# Patient Record
Sex: FEMALE | Race: WHITE | NOT HISPANIC OR LATINO | Employment: UNEMPLOYED | URBAN - METROPOLITAN AREA
[De-identification: names, ages, dates, MRNs, and addresses within clinical notes are randomized per-mention and may not be internally consistent; named-entity substitution may affect disease eponyms.]

---

## 2017-02-23 ENCOUNTER — TRANSCRIBE ORDERS (OUTPATIENT)
Dept: ADMINISTRATIVE | Facility: HOSPITAL | Age: 47
End: 2017-02-23

## 2017-02-23 DIAGNOSIS — N92.0 EXCESSIVE OR FREQUENT MENSTRUATION: Primary | ICD-10-CM

## 2017-02-27 ENCOUNTER — HOSPITAL ENCOUNTER (OUTPATIENT)
Dept: RADIOLOGY | Facility: HOSPITAL | Age: 47
Discharge: HOME | End: 2017-02-27
Attending: OBSTETRICS & GYNECOLOGY
Payer: COMMERCIAL

## 2017-02-27 DIAGNOSIS — N92.0 EXCESSIVE OR FREQUENT MENSTRUATION: ICD-10-CM

## 2017-02-27 PROCEDURE — 76830 TRANSVAGINAL US NON-OB: CPT

## 2017-02-27 PROCEDURE — 76856 US EXAM PELVIC COMPLETE: CPT

## 2017-06-21 ENCOUNTER — TRANSCRIBE ORDERS (OUTPATIENT)
Dept: ADMINISTRATIVE | Facility: HOSPITAL | Age: 47
End: 2017-06-21

## 2017-06-21 DIAGNOSIS — Z12.31 VISIT FOR SCREENING MAMMOGRAM: Primary | ICD-10-CM

## 2017-07-24 ENCOUNTER — HOSPITAL ENCOUNTER (OUTPATIENT)
Dept: RADIOLOGY | Facility: HOSPITAL | Age: 47
Discharge: HOME | End: 2017-07-24
Attending: OBSTETRICS & GYNECOLOGY
Payer: COMMERCIAL

## 2017-07-24 DIAGNOSIS — Z12.31 VISIT FOR SCREENING MAMMOGRAM: ICD-10-CM

## 2017-07-24 PROCEDURE — G0202 SCR MAMMO BI INCL CAD: HCPCS

## 2017-08-17 ENCOUNTER — ALLSCRIPTS OFFICE VISIT (OUTPATIENT)
Dept: OTHER | Facility: OTHER | Age: 47
End: 2017-08-17

## 2017-08-30 ENCOUNTER — ALLSCRIPTS OFFICE VISIT (OUTPATIENT)
Dept: OTHER | Facility: OTHER | Age: 47
End: 2017-08-30

## 2018-01-12 VITALS
SYSTOLIC BLOOD PRESSURE: 112 MMHG | HEART RATE: 76 BPM | HEIGHT: 61 IN | RESPIRATION RATE: 16 BRPM | DIASTOLIC BLOOD PRESSURE: 64 MMHG | OXYGEN SATURATION: 97 % | BODY MASS INDEX: 23.41 KG/M2 | WEIGHT: 124 LBS

## 2018-01-12 NOTE — PROGRESS NOTES
Chief Complaint  Patient her for nurse visit fir PPD placement as per job requirement  PPD placed to [facility] as per protocol and patient has appointment to have site read on Friday  Active Problems    1  Depression screen (V79 0) (Z13 89)   2  PPD screening test (V74 1) (Z11 1)    Current Meds   1  Daily Multiple Vitamins Oral Tablet; Therapy: (Recorded:[id number]) to Recorded    Allergies    1  Codeine Derivatives    2  Apples   3  FRUIT   4  No Known Latex Allergies    Assessment    1  PPD screening test (V74 1) (Z11 1)    Plan  PPD screening test    · Tubersol 5 UNIT/0 1ML Intradermal Solution    Future Appointments    Date/Time Provider Specialty Site   09/01/2017 01:30 PM Ayanna Medina, Nurse Schedule  [facility]     Signatures   Electronically signed by :  JEROD Smith ; Aug 30 2017 12:29PM EST                       (Acknowledgement)

## 2018-01-17 NOTE — PROGRESS NOTES
Assessment    1  Depression screen (V79 0) (Z13 89)   2  Encounter for preventive health examination (V70 0) (Z00 00)    Plan  Depression screen    · *VB-Depression Screening; Status:Resulted - Requires Verification,Retrospective  Authorization;   Done: [id number] 10:43AM    Discussion/Summary  health maintenance visit Currently, she eats a healthy diet  the risks and benefits of cervical cancer screening were discussed cervical cancer screening is current Testing was done today for declines testing  Breast cancer screening: the risks and benefits of breast cancer screening were discussed and mammogram is current  Colorectal cancer screening: colorectal cancer screening is not indicated  Osteoporosis screening: the risks and benefits of osteoporosis screening were discussed  The risks and benefits of immunizations were discussed  She was advised to be evaluated by a dentist  Advice and education were given regarding nutrition, aerobic exercise, contraception, cardiovascular risk reduction, alcohol use, sunscreen use, self skin examination and seat belt use  42-year-old female here for first physical exam in our office - she's starting a new job  - no concerns today  - return as needed or annually for physical exam  - advised to follow up with dentist annually  - gyn/pelvic/breast exam deferred as per patient request since patient states that she has a gyn appointment next week with Dr Addison    d/w Dr Geeta Parada  The patient was counseled regarding instructions for management, risk factor reductions, prognosis, patient and family education, impressions, risks and benefits of treatment options, importance of compliance with treatment  The treatment plan was reviewed with the patient/guardian   The patient/guardian understands and agrees with the treatment plan      Chief Complaint  physical for work      History of Present Illness  HPI: 42-year-old female here for first annual physical to our office  - states that she needs a physical for - states that 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  - today she has no complaints  - states that her last physical was more than 5 years ago   currently- not on any meds    pshx- breast augmentation surgery- had mammo recently - gets follow up with her breast surgeon  allergies- fruits- apples and peaches- can't eat the raw fruits  fhx- mother has DM , mother side has DM  social- lives at home 3 kids, doesn't smoke or illict drugs, drinks wine occasional   diet- states she eats healthy varied diet consisting of fruits, veggies, meat and dairy, does exercise x3/week      has appt with Dr Carlos Milian - next week - for GYN exam  LMP- 2017- has some hot flashes and vaginal dryness- states she will talk with her GYN next week  last PAP - - was normal  had mammo in 2017      Review of Systems    Constitutional: no fever and no chills  Eyes: no purulent discharge from the eyes  ENT: no nosebleeds and no nasal discharge  Cardiovascular: no chest pain  Respiratory: no shortness of breath and no cough  Gastrointestinal: no abdominal pain and no nausea  Genitourinary: no dysuria and no pelvic pain  Musculoskeletal: no arthralgias, no joint swelling and no myalgias  Integumentary: no rashes  Neurological: no headache, no numbness and no dizziness  Psychiatric: no sleep disturbances and no emotional problems  Endocrine: no muscle weakness  Hematologic/Lymphatic: no tendency for easy bleeding  Active Problems    1   Depression screen (V79 0) (Z13 89)    Surgical History    · History of Breast Surgery Lumpectomy   · History of  Section Low Transverse    Family History  Mother    · Family history of diabetes mellitus (V18 0) (Z83 3)  Maternal Grandmother    · Family history of malignant neoplasm of cervix uteri (V16 49) (Z80 49)  Family History    · Family history of Emphysema/COPD    Social History    · Always uses seat belt   ·    · Denied: History of Drug use   · Never a smoker   · Non-smoker (V49 89) (Z78 9)   · Social alcohol use (Z78 9)    Current Meds   1  Daily Multiple Vitamins Oral Tablet; Therapy: (Recorded:17Aug2017) to Recorded    Allergies    1  Codeine Derivatives    2  Apples   3  FRUIT   4  No Known Latex Allergies    Vitals   Recorded: 17Aug2017 10:38AM   Heart Rate 76   Respiration 16   Systolic 630   Diastolic 64   Height 5 ft 1 in   Weight 124 lb    BMI Calculated 23 43   BSA Calculated 1 54   O2 Saturation 97   Pain Scale 0     Physical Exam    Constitutional   General appearance: No acute distress, well appearing and well nourished  Head and Face   Head and face: Normal     Palpation of the face and sinuses: No sinus tenderness  Eyes   Conjunctiva and lids: No swelling, erythema or discharge  Pupils and irises: Equal, round, reactive to light  Ears, Nose, Mouth, and Throat   External inspection of ears and nose: Normal     Otoscopic examination: Tympanic membranes translucent with normal light reflex  Canals patent without erythema  Lips, teeth, and gums: Normal, good dentition  Oropharynx: Normal with no erythema, edema, exudate or lesions  Neck   Neck: Supple, symmetric, trachea midline, no masses  Thyroid: Normal, no thyromegaly  Pulmonary   Respiratory effort: No increased work of breathing or signs of respiratory distress  Auscultation of lungs: Clear to auscultation  Cardiovascular   Auscultation of heart: Normal rate and rhythm, normal S1 and S2, no murmurs  Examination of extremities for edema and/or varicosities: Normal     Abdomen   Abdomen: Non-tender, no masses  Lymphatic   Palpation of lymph nodes in neck: No lymphadenopathy  Palpation of lymph nodes in axillae: No lymphadenopathy  Musculoskeletal   Gait and station: Normal     Digits and nails: Normal without clubbing or cyanosis      Joints, bones, and muscles: Normal     Range of motion: Normal  Stability: Normal     Muscle strength/tone: Normal     Skin   Skin and subcutaneous tissue: Normal without rashes or lesions  Neurologic   Cranial nerves: Cranial nerves II-XII intact   grossly  Reflexes: 2+ and symmetric  Sensation: No sensory loss  Psychiatric   Orientation to person, place, and time: Normal     Mood and affect: Normal        Results/Data  *VB-Depression Screening 17Aug2017 10:43AM Dut, Maximojeet     Test Name Result Flag Reference   Depression Scale Result      Depression Screen - Negative For Symptoms                       PHQ-2 Adult Depression Screening 17Aug2017 10:42AM User, Ahs     Test Name Result Flag Reference   PHQ-2 Adult Depression Score 0     Over the last two weeks, how often have you been bothered by any of the following problems? Little interest or pleasure in doing things: Not at all - 0  Feeling down, depressed, or hopeless: Not at all - 0   PHQ-2 Adult Depression Screening Negative         Attending Note  Attending Note: Attending Note: I discussed the case with the Resident and reviewed the Resident's note, I supervised the Resident and I agree with the Resident management plan as it was presented to me  Level of Participation: I was present in clinic, but did not examine the patient  I agree with the Resident's note  Signatures   Electronically signed by :  JEROD Delgado ; Aug 18 2017  4:22PM EST                       (Author)    Electronically signed by : JEROD Owen ; Aug 18 2017  4:38PM EST                       (Author)

## 2018-09-08 ENCOUNTER — APPOINTMENT (EMERGENCY)
Dept: RADIOLOGY | Facility: HOSPITAL | Age: 48
DRG: 165 | End: 2018-09-08
Payer: COMMERCIAL

## 2018-09-08 ENCOUNTER — ANESTHESIA (INPATIENT)
Dept: PERIOP | Facility: HOSPITAL | Age: 48
DRG: 165 | End: 2018-09-08
Payer: COMMERCIAL

## 2018-09-08 ENCOUNTER — ANESTHESIA EVENT (INPATIENT)
Dept: PERIOP | Facility: HOSPITAL | Age: 48
DRG: 165 | End: 2018-09-08
Payer: COMMERCIAL

## 2018-09-08 ENCOUNTER — HOSPITAL ENCOUNTER (INPATIENT)
Facility: HOSPITAL | Age: 48
LOS: 1 days | Discharge: HOME | DRG: 165 | End: 2018-09-09
Attending: EMERGENCY MEDICINE | Admitting: SPECIALIST
Payer: COMMERCIAL

## 2018-09-08 DIAGNOSIS — K35.80 ACUTE APPENDICITIS: Primary | ICD-10-CM

## 2018-09-08 LAB
ALBUMIN SERPL BCP-MCNC: 3.7 G/DL (ref 3.5–5)
ALP SERPL-CCNC: 84 U/L (ref 46–116)
ALT SERPL W P-5'-P-CCNC: 23 U/L (ref 12–78)
ANION GAP SERPL CALCULATED.3IONS-SCNC: 6 MMOL/L (ref 4–13)
AST SERPL W P-5'-P-CCNC: 21 U/L (ref 5–45)
B-HCG SERPL-ACNC: 3 MIU/ML
BACTERIA UR QL AUTO: NORMAL /HPF
BASOPHILS # BLD AUTO: 0.03 THOUSANDS/ΜL (ref 0–0.1)
BASOPHILS NFR BLD AUTO: 0 % (ref 0–1)
BILIRUB SERPL-MCNC: 0.9 MG/DL (ref 0.2–1)
BILIRUB UR QL STRIP: NEGATIVE
BUN SERPL-MCNC: 10 MG/DL (ref 5–25)
CALCIUM SERPL-MCNC: 8.7 MG/DL (ref 8.3–10.1)
CHLORIDE SERPL-SCNC: 104 MMOL/L (ref 100–108)
CLARITY UR: CLEAR
CO2 SERPL-SCNC: 29 MMOL/L (ref 21–32)
COLOR UR: YELLOW
CREAT SERPL-MCNC: 0.87 MG/DL (ref 0.6–1.3)
EOSINOPHIL # BLD AUTO: 0.01 THOUSAND/ΜL (ref 0–0.61)
EOSINOPHIL NFR BLD AUTO: 0 % (ref 0–6)
ERYTHROCYTE [DISTWIDTH] IN BLOOD BY AUTOMATED COUNT: 13 % (ref 11.6–15.1)
GFR SERPL CREATININE-BSD FRML MDRD: 78 ML/MIN/1.73SQ M
GLUCOSE SERPL-MCNC: 101 MG/DL (ref 65–140)
GLUCOSE UR STRIP-MCNC: NEGATIVE MG/DL
HCT VFR BLD AUTO: 41.2 % (ref 34.8–46.1)
HGB BLD-MCNC: 13.6 G/DL (ref 11.5–15.4)
HGB UR QL STRIP.AUTO: ABNORMAL
IMM GRANULOCYTES # BLD AUTO: 0.05 THOUSAND/UL (ref 0–0.2)
IMM GRANULOCYTES NFR BLD AUTO: 0 % (ref 0–2)
KETONES UR STRIP-MCNC: ABNORMAL MG/DL
LACTATE SERPL-SCNC: 1.3 MMOL/L (ref 0.5–2)
LEUKOCYTE ESTERASE UR QL STRIP: NEGATIVE
LIPASE SERPL-CCNC: 92 U/L (ref 73–393)
LYMPHOCYTES # BLD AUTO: 1.02 THOUSANDS/ΜL (ref 0.6–4.47)
LYMPHOCYTES NFR BLD AUTO: 7 % (ref 14–44)
MCH RBC QN AUTO: 29.8 PG (ref 26.8–34.3)
MCHC RBC AUTO-ENTMCNC: 33 G/DL (ref 31.4–37.4)
MCV RBC AUTO: 90 FL (ref 82–98)
MONOCYTES # BLD AUTO: 0.67 THOUSAND/ΜL (ref 0.17–1.22)
MONOCYTES NFR BLD AUTO: 5 % (ref 4–12)
NEUTROPHILS # BLD AUTO: 12.33 THOUSANDS/ΜL (ref 1.85–7.62)
NEUTS SEG NFR BLD AUTO: 88 % (ref 43–75)
NITRITE UR QL STRIP: NEGATIVE
NON-SQ EPI CELLS URNS QL MICRO: NORMAL /HPF
NRBC BLD AUTO-RTO: 0 /100 WBCS
PH UR STRIP.AUTO: 6.5 [PH] (ref 5–9)
PLATELET # BLD AUTO: 208 THOUSANDS/UL (ref 149–390)
PMV BLD AUTO: 10.4 FL (ref 8.9–12.7)
POTASSIUM SERPL-SCNC: 3.7 MMOL/L (ref 3.5–5.3)
PROT SERPL-MCNC: 7.1 G/DL (ref 6.4–8.2)
PROT UR STRIP-MCNC: NEGATIVE MG/DL
RBC # BLD AUTO: 4.56 MILLION/UL (ref 3.81–5.12)
RBC #/AREA URNS AUTO: NORMAL /HPF
SODIUM SERPL-SCNC: 139 MMOL/L (ref 136–145)
SP GR UR STRIP.AUTO: <=1.005 (ref 1–1.03)
UROBILINOGEN UR QL STRIP.AUTO: 0.2 E.U./DL
WBC # BLD AUTO: 14.11 THOUSAND/UL (ref 4.31–10.16)
WBC #/AREA URNS AUTO: NORMAL /HPF

## 2018-09-08 PROCEDURE — 96375 TX/PRO/DX INJ NEW DRUG ADDON: CPT

## 2018-09-08 PROCEDURE — 88304 TISSUE EXAM BY PATHOLOGIST: CPT | Performed by: PATHOLOGY

## 2018-09-08 PROCEDURE — 84702 CHORIONIC GONADOTROPIN TEST: CPT | Performed by: EMERGENCY MEDICINE

## 2018-09-08 PROCEDURE — 99285 EMERGENCY DEPT VISIT HI MDM: CPT

## 2018-09-08 PROCEDURE — 83690 ASSAY OF LIPASE: CPT | Performed by: EMERGENCY MEDICINE

## 2018-09-08 PROCEDURE — 83605 ASSAY OF LACTIC ACID: CPT | Performed by: EMERGENCY MEDICINE

## 2018-09-08 PROCEDURE — 0DTJ4ZZ RESECTION OF APPENDIX, PERCUTANEOUS ENDOSCOPIC APPROACH: ICD-10-PCS | Performed by: SPECIALIST

## 2018-09-08 PROCEDURE — 80053 COMPREHEN METABOLIC PANEL: CPT | Performed by: EMERGENCY MEDICINE

## 2018-09-08 PROCEDURE — 85025 COMPLETE CBC W/AUTO DIFF WBC: CPT | Performed by: EMERGENCY MEDICINE

## 2018-09-08 PROCEDURE — 74177 CT ABD & PELVIS W/CONTRAST: CPT

## 2018-09-08 PROCEDURE — 96361 HYDRATE IV INFUSION ADD-ON: CPT

## 2018-09-08 PROCEDURE — 81001 URINALYSIS AUTO W/SCOPE: CPT | Performed by: EMERGENCY MEDICINE

## 2018-09-08 PROCEDURE — 36415 COLL VENOUS BLD VENIPUNCTURE: CPT | Performed by: EMERGENCY MEDICINE

## 2018-09-08 PROCEDURE — 96374 THER/PROPH/DIAG INJ IV PUSH: CPT

## 2018-09-08 PROCEDURE — 87086 URINE CULTURE/COLONY COUNT: CPT | Performed by: EMERGENCY MEDICINE

## 2018-09-08 RX ORDER — FENTANYL CITRATE 50 UG/ML
INJECTION, SOLUTION INTRAMUSCULAR; INTRAVENOUS AS NEEDED
Status: DISCONTINUED | OUTPATIENT
Start: 2018-09-08 | End: 2018-09-08 | Stop reason: SURG

## 2018-09-08 RX ORDER — ONDANSETRON 2 MG/ML
INJECTION INTRAMUSCULAR; INTRAVENOUS AS NEEDED
Status: DISCONTINUED | OUTPATIENT
Start: 2018-09-08 | End: 2018-09-08 | Stop reason: SURG

## 2018-09-08 RX ORDER — MIDAZOLAM HYDROCHLORIDE 1 MG/ML
INJECTION INTRAMUSCULAR; INTRAVENOUS AS NEEDED
Status: DISCONTINUED | OUTPATIENT
Start: 2018-09-08 | End: 2018-09-08 | Stop reason: SURG

## 2018-09-08 RX ORDER — ROCURONIUM BROMIDE 10 MG/ML
INJECTION, SOLUTION INTRAVENOUS AS NEEDED
Status: DISCONTINUED | OUTPATIENT
Start: 2018-09-08 | End: 2018-09-08 | Stop reason: SURG

## 2018-09-08 RX ORDER — ONDANSETRON 2 MG/ML
4 INJECTION INTRAMUSCULAR; INTRAVENOUS ONCE
Status: COMPLETED | OUTPATIENT
Start: 2018-09-08 | End: 2018-09-08

## 2018-09-08 RX ORDER — CHLORHEXIDINE GLUCONATE 4 G/100ML
SOLUTION TOPICAL DAILY PRN
Status: DISCONTINUED | OUTPATIENT
Start: 2018-09-08 | End: 2018-09-09 | Stop reason: HOSPADM

## 2018-09-08 RX ORDER — ONDANSETRON 2 MG/ML
4 INJECTION INTRAMUSCULAR; INTRAVENOUS ONCE AS NEEDED
Status: DISCONTINUED | OUTPATIENT
Start: 2018-09-08 | End: 2018-09-09 | Stop reason: HOSPADM

## 2018-09-08 RX ORDER — FENTANYL CITRATE/PF 50 MCG/ML
50 SYRINGE (ML) INJECTION
Status: DISCONTINUED | OUTPATIENT
Start: 2018-09-08 | End: 2018-09-09 | Stop reason: HOSPADM

## 2018-09-08 RX ORDER — LIDOCAINE HYDROCHLORIDE 10 MG/ML
INJECTION, SOLUTION INFILTRATION; PERINEURAL AS NEEDED
Status: DISCONTINUED | OUTPATIENT
Start: 2018-09-08 | End: 2018-09-08 | Stop reason: SURG

## 2018-09-08 RX ORDER — SODIUM CHLORIDE 9 MG/ML
INJECTION, SOLUTION INTRAVENOUS CONTINUOUS PRN
Status: DISCONTINUED | OUTPATIENT
Start: 2018-09-08 | End: 2018-09-08 | Stop reason: SURG

## 2018-09-08 RX ORDER — ONDANSETRON 2 MG/ML
INJECTION INTRAMUSCULAR; INTRAVENOUS
Status: COMPLETED
Start: 2018-09-08 | End: 2018-09-08

## 2018-09-08 RX ORDER — PROMETHAZINE HYDROCHLORIDE 25 MG/ML
12.5 INJECTION, SOLUTION INTRAMUSCULAR; INTRAVENOUS ONCE AS NEEDED
Status: DISCONTINUED | OUTPATIENT
Start: 2018-09-08 | End: 2018-09-09 | Stop reason: HOSPADM

## 2018-09-08 RX ORDER — MORPHINE SULFATE 4 MG/ML
4 INJECTION, SOLUTION INTRAMUSCULAR; INTRAVENOUS ONCE
Status: COMPLETED | OUTPATIENT
Start: 2018-09-08 | End: 2018-09-08

## 2018-09-08 RX ORDER — BUPIVACAINE HYDROCHLORIDE AND EPINEPHRINE 5; 5 MG/ML; UG/ML
INJECTION, SOLUTION PERINEURAL AS NEEDED
Status: DISCONTINUED | OUTPATIENT
Start: 2018-09-08 | End: 2018-09-08 | Stop reason: HOSPADM

## 2018-09-08 RX ORDER — SUCCINYLCHOLINE CHLORIDE 20 MG/ML
INJECTION INTRAMUSCULAR; INTRAVENOUS AS NEEDED
Status: DISCONTINUED | OUTPATIENT
Start: 2018-09-08 | End: 2018-09-08 | Stop reason: SURG

## 2018-09-08 RX ORDER — SODIUM CHLORIDE, SODIUM LACTATE, POTASSIUM CHLORIDE, AND CALCIUM CHLORIDE .6; .31; .03; .02 G/100ML; G/100ML; G/100ML; G/100ML
IRRIGANT IRRIGATION AS NEEDED
Status: DISCONTINUED | OUTPATIENT
Start: 2018-09-08 | End: 2018-09-08 | Stop reason: HOSPADM

## 2018-09-08 RX ORDER — PROPOFOL 10 MG/ML
INJECTION, EMULSION INTRAVENOUS AS NEEDED
Status: DISCONTINUED | OUTPATIENT
Start: 2018-09-08 | End: 2018-09-08 | Stop reason: SURG

## 2018-09-08 RX ADMIN — SUCCINYLCHOLINE CHLORIDE 60 MG: 20 INJECTION, SOLUTION INTRAMUSCULAR; INTRAVENOUS at 22:10

## 2018-09-08 RX ADMIN — IOHEXOL 100 ML: 350 INJECTION, SOLUTION INTRAVENOUS at 20:13

## 2018-09-08 RX ADMIN — SODIUM CHLORIDE: 0.9 INJECTION, SOLUTION INTRAVENOUS at 21:59

## 2018-09-08 RX ADMIN — ONDANSETRON 4 MG: 2 INJECTION INTRAMUSCULAR; INTRAVENOUS at 21:58

## 2018-09-08 RX ADMIN — PROPOFOL 150 MG: 10 INJECTION, EMULSION INTRAVENOUS at 22:10

## 2018-09-08 RX ADMIN — ROCURONIUM BROMIDE 30 MG: 10 INJECTION INTRAVENOUS at 22:16

## 2018-09-08 RX ADMIN — FENTANYL CITRATE 50 MCG: 50 INJECTION, SOLUTION INTRAMUSCULAR; INTRAVENOUS at 22:34

## 2018-09-08 RX ADMIN — ONDANSETRON 4 MG: 2 INJECTION INTRAMUSCULAR; INTRAVENOUS at 19:08

## 2018-09-08 RX ADMIN — METRONIDAZOLE 500 MG: 500 INJECTION, SOLUTION INTRAVENOUS at 22:13

## 2018-09-08 RX ADMIN — MIDAZOLAM HYDROCHLORIDE 2 MG: 1 INJECTION, SOLUTION INTRAMUSCULAR; INTRAVENOUS at 21:58

## 2018-09-08 RX ADMIN — SODIUM CHLORIDE 1000 ML: 0.9 INJECTION, SOLUTION INTRAVENOUS at 19:42

## 2018-09-08 RX ADMIN — SUGAMMADEX 120 MG: 100 INJECTION, SOLUTION INTRAVENOUS at 23:11

## 2018-09-08 RX ADMIN — LIDOCAINE HYDROCHLORIDE 50 MG: 10 INJECTION, SOLUTION INFILTRATION; PERINEURAL at 22:10

## 2018-09-08 RX ADMIN — DEXAMETHASONE SODIUM PHOSPHATE 4 MG: 10 INJECTION INTRAMUSCULAR; INTRAVENOUS at 22:21

## 2018-09-08 RX ADMIN — MORPHINE SULFATE 4 MG: 4 INJECTION INTRAVENOUS at 19:08

## 2018-09-08 RX ADMIN — ONDANSETRON 4 MG: 2 INJECTION INTRAMUSCULAR; INTRAVENOUS at 21:07

## 2018-09-08 RX ADMIN — CEFAZOLIN SODIUM 1000 MG: 1 SOLUTION INTRAVENOUS at 20:34

## 2018-09-08 RX ADMIN — FENTANYL CITRATE 50 MCG: 50 INJECTION, SOLUTION INTRAMUSCULAR; INTRAVENOUS at 22:09

## 2018-09-09 VITALS
HEIGHT: 62 IN | SYSTOLIC BLOOD PRESSURE: 100 MMHG | BODY MASS INDEX: 23 KG/M2 | WEIGHT: 125 LBS | RESPIRATION RATE: 17 BRPM | OXYGEN SATURATION: 97 % | DIASTOLIC BLOOD PRESSURE: 59 MMHG | HEART RATE: 55 BPM | TEMPERATURE: 98.1 F

## 2018-09-09 LAB
ANION GAP SERPL CALCULATED.3IONS-SCNC: 7 MMOL/L (ref 4–13)
BASOPHILS # BLD AUTO: 0.01 THOUSANDS/ΜL (ref 0–0.1)
BASOPHILS NFR BLD AUTO: 0 % (ref 0–1)
BUN SERPL-MCNC: 10 MG/DL (ref 5–25)
CALCIUM SERPL-MCNC: 7.7 MG/DL (ref 8.3–10.1)
CHLORIDE SERPL-SCNC: 105 MMOL/L (ref 100–108)
CO2 SERPL-SCNC: 24 MMOL/L (ref 21–32)
CREAT SERPL-MCNC: 0.82 MG/DL (ref 0.6–1.3)
EOSINOPHIL # BLD AUTO: 0 THOUSAND/ΜL (ref 0–0.61)
EOSINOPHIL NFR BLD AUTO: 0 % (ref 0–6)
ERYTHROCYTE [DISTWIDTH] IN BLOOD BY AUTOMATED COUNT: 13 % (ref 11.6–15.1)
GFR SERPL CREATININE-BSD FRML MDRD: 84 ML/MIN/1.73SQ M
GLUCOSE SERPL-MCNC: 136 MG/DL (ref 65–140)
HCT VFR BLD AUTO: 36.4 % (ref 34.8–46.1)
HGB BLD-MCNC: 11.8 G/DL (ref 11.5–15.4)
IMM GRANULOCYTES # BLD AUTO: 0.04 THOUSAND/UL (ref 0–0.2)
IMM GRANULOCYTES NFR BLD AUTO: 0 % (ref 0–2)
LYMPHOCYTES # BLD AUTO: 0.57 THOUSANDS/ΜL (ref 0.6–4.47)
LYMPHOCYTES NFR BLD AUTO: 5 % (ref 14–44)
MCH RBC QN AUTO: 29.9 PG (ref 26.8–34.3)
MCHC RBC AUTO-ENTMCNC: 32.4 G/DL (ref 31.4–37.4)
MCV RBC AUTO: 92 FL (ref 82–98)
MONOCYTES # BLD AUTO: 0.39 THOUSAND/ΜL (ref 0.17–1.22)
MONOCYTES NFR BLD AUTO: 3 % (ref 4–12)
NEUTROPHILS # BLD AUTO: 11.01 THOUSANDS/ΜL (ref 1.85–7.62)
NEUTS SEG NFR BLD AUTO: 92 % (ref 43–75)
NRBC BLD AUTO-RTO: 0 /100 WBCS
PLATELET # BLD AUTO: 178 THOUSANDS/UL (ref 149–390)
PMV BLD AUTO: 10.2 FL (ref 8.9–12.7)
POTASSIUM SERPL-SCNC: 4.1 MMOL/L (ref 3.5–5.3)
RBC # BLD AUTO: 3.95 MILLION/UL (ref 3.81–5.12)
SODIUM SERPL-SCNC: 136 MMOL/L (ref 136–145)
WBC # BLD AUTO: 12.02 THOUSAND/UL (ref 4.31–10.16)

## 2018-09-09 PROCEDURE — 80048 BASIC METABOLIC PNL TOTAL CA: CPT | Performed by: SPECIALIST

## 2018-09-09 PROCEDURE — 85025 COMPLETE CBC W/AUTO DIFF WBC: CPT | Performed by: SPECIALIST

## 2018-09-09 PROCEDURE — 87081 CULTURE SCREEN ONLY: CPT | Performed by: SPECIALIST

## 2018-09-09 RX ORDER — KETOROLAC TROMETHAMINE 30 MG/ML
15 INJECTION, SOLUTION INTRAMUSCULAR; INTRAVENOUS EVERY 6 HOURS SCHEDULED
Status: DISCONTINUED | OUTPATIENT
Start: 2018-09-09 | End: 2018-09-09 | Stop reason: HOSPADM

## 2018-09-09 RX ORDER — SODIUM CHLORIDE, SODIUM LACTATE, POTASSIUM CHLORIDE, CALCIUM CHLORIDE 600; 310; 30; 20 MG/100ML; MG/100ML; MG/100ML; MG/100ML
100 INJECTION, SOLUTION INTRAVENOUS CONTINUOUS
Status: DISCONTINUED | OUTPATIENT
Start: 2018-09-09 | End: 2018-09-09 | Stop reason: HOSPADM

## 2018-09-09 RX ORDER — IBUPROFEN 100 MG/1
100 TABLET, CHEWABLE ORAL EVERY 8 HOURS PRN
Qty: 30 TABLET | Refills: 0 | Status: SHIPPED | OUTPATIENT
Start: 2018-09-09 | End: 2020-11-06 | Stop reason: ALTCHOICE

## 2018-09-09 RX ORDER — HEPARIN SODIUM 5000 [USP'U]/ML
5000 INJECTION, SOLUTION INTRAVENOUS; SUBCUTANEOUS EVERY 8 HOURS SCHEDULED
Status: DISCONTINUED | OUTPATIENT
Start: 2018-09-09 | End: 2018-09-09 | Stop reason: HOSPADM

## 2018-09-09 RX ADMIN — SODIUM CHLORIDE, SODIUM LACTATE, POTASSIUM CHLORIDE, AND CALCIUM CHLORIDE 50 ML/HR: .6; .31; .03; .02 INJECTION, SOLUTION INTRAVENOUS at 00:59

## 2018-09-09 RX ADMIN — KETOROLAC TROMETHAMINE 15 MG: 30 INJECTION, SOLUTION INTRAMUSCULAR at 00:25

## 2018-09-09 RX ADMIN — METRONIDAZOLE 500 MG: 500 INJECTION, SOLUTION INTRAVENOUS at 06:40

## 2018-09-09 RX ADMIN — HEPARIN SODIUM 5000 UNITS: 5000 INJECTION, SOLUTION INTRAVENOUS; SUBCUTANEOUS at 06:43

## 2018-09-09 RX ADMIN — METRONIDAZOLE 500 MG: 500 INJECTION, SOLUTION INTRAVENOUS at 14:15

## 2018-09-09 RX ADMIN — KETOROLAC TROMETHAMINE 15 MG: 30 INJECTION, SOLUTION INTRAMUSCULAR at 12:25

## 2018-09-09 RX ADMIN — Medication 500 MG: at 05:26

## 2018-09-09 RX ADMIN — CEFAZOLIN SODIUM 1000 MG: 1 SOLUTION INTRAVENOUS at 13:20

## 2018-09-09 NOTE — DISCHARGE INSTRUCTIONS
Appendicitis   WHAT YOU SHOULD KNOW:   Appendicitis is inflammation of the appendix  The appendix is a small pouch that is attached to the large intestine on the lower right side of the abdomen  AFTER YOU LEAVE:   Medicines:   · Pain medicine: You may be given medicine to take away or decrease pain  Do not wait until the pain is severe before you take your medicine  · Antibiotics: This medicine is given to fight or prevent an infection caused by bacteria  Always take your antibiotics exactly as ordered by your healthcare provider  Do not stop taking your medicine unless directed by your healthcare provider  Never save antibiotics or take leftover antibiotics that were given to you for another illness  · Take your medicine as directed  Call your healthcare provider if you think your medicine is not helping or if you have side effects  Tell him if you are allergic to any medicine  Keep a list of the medicines, vitamins, and herbs you take  Include the amounts, and when and why you take them  Bring the list or the pill bottles to follow-up visits  Carry your medicine list with you in case of an emergency  Follow up with your healthcare provider in 2 weeks:  Write down your questions so you remember to ask them during your visits  Activity:  You may need to limit activity for 4 to 6 weeks after surgery  Ask your healthcare provider what activities you can do  Contact your healthcare provider if:   · You have abdominal pain that does not go away, even after you take medicine  · You have chills, a cough, or feel weak and achy  · You have trouble having a bowel movement or have diarrhea  · You have questions or concerns about your condition or care  Seek care immediately or call 911 if:   · You have a fever  · You have severe pain in your abdomen  · You are vomiting and cannot keep food down    © 2014 [employer] is for End User's use only and may not be sold, redistributed or otherwise used for commercial purposes  All illustrations and images included in CareNotes® are the copyrighted property of [employer]  or [employer]  The above information is an  only  It is not intended as medical advice for individual conditions or treatments  Talk to your doctor, nurse or pharmacist before following any medical regimen to see if it is safe and effective for you  Please follow carefully all instructions checked below  Shan Payer  Pre-op Diagnosis:   Acute appendicitis [K35 80]  Post-op Diagnosis:  * No post-op diagnosis entered *   Post-Op Diagnosis Codes:     * Acute appendicitis [K35 80]  Procedure(s):  APPENDECTOMY LAPAROSCOPIC  Surgeon(s):  Talib Skinner MD    1  Diet:  · Resume your normal diet  Avoid red meat eat lots of yogurt  2  Medications:  · Home medications reviewed  Resume pre-operative medications unless noted below  · Prescription sent home with patient and Prescription sent over to pharmacy  · See after visit summary for reconciled discharge medications provided to patient and family  3  Activities:  · Do NOT make important personal or business decisions for 24 hours  · Do NOT drive or operate machinery for 7 days  · While taking pain medication, do not drive and be careful as you walk or climb stairs  · Limit your activities for 3 weeks  Do not engage in sports, heavy work or heavy  lifting until your physician gives you permission  4  Wound Care:  · Change dressings as necessary after 2 days  · Keep dressings dry  5  Special Instructions:  · Full weight bearing  6  Bathing:  · May shower after 2 days  7  When to Call:       Call if fever, Nausea, vomiting, Constipation not feeling well, or wound infection,      bleeding,reddness and excessive pain,  8  Follow-up Care:  · Make an appointment to see MD Alphonso Hernández FACS  in 10 days for Follow up   Please Call Office  [phone] for appointment, · Call if fever, Nausea, vomiting, Constipation not feeling well, or wound infection, bleeding,reddness and excessive pain,    Maryjane Bella MD MS FRCS FACS  01/03/18  2:24 PM

## 2018-09-09 NOTE — H&P
History and Physical Examination - General Surgery   Grant Williamson 50 y o  female MRN: [id number]  Unit/Bed#: JACKIE Encounter: [id number] PCP: Whitley Dumont MD    History of Present Illness   Chief Complaint:   Abdominal pain right lower quadrant 1 day duration    HPI:  Grant Williamson is a 50 y o  female who presents with 1 day duration of abdominal pain patient has similar type of pain 2 months ago a thought may be appendicitis but it got better  Denies any fever chills rigors as some nausea 1 episode of vomiting some anorexia  Pain was getting progressively was came to the ER workup with the blood work and CT scan revealed acute appendicitis Surgical consult was obtained    And patient was taken on emergent basis for surgical laparoscopic possible open appendectomy    Historical Information   History reviewed  No pertinent past medical history  History reviewed  No pertinent surgical history  Social History   History   Alcohol Use    Yes     Comment: occasional     History   Drug Use No     History   Smoking Status    Former Smoker   Smokeless Tobacco    Never Used     Family History: History reviewed  No pertinent family history  Meds/Allergies   Allergies   Allergen Reactions    Apple     Codeine     Peach  [Prunus Persica]      Annotation - [id number]: peaches       Current Facility-Administered Medications:     metroNIDAZOLE (FLAGYL) IVPB (premix) 500 mg, 500 mg, Intravenous, Once, Isela Husk, DO  No current outpatient prescriptions on file       REVIEW OF SYSTEMS  Constitutional:  Denies fever or chills anorexia  Eyes:  Denies change in visual acuity   HENT:  Denies nasal congestion or sore throat   Respiratory:  Denies cough or shortness of breath   Cardiovascular:  Denies chest pain or edema   GI:  Positive for abdominal pain, nausea, vomiting, no bloody stools or diarrhea   :  Denies dysuria, frequency, difficulty in micturition and nocturia  Musculoskeletal:  Denies back pain or joint pain   Neurologic:  Denies headache, focal weakness or sensory changes   Endocrine:  Denies polyuria or polydipsia   Lymphatic:  Denies swollen glands   Psychiatric:  Denies depression or anxiety     Objective   Current Vitals:   Blood Pressure: 96/52 (18)  Pulse: 73 (18)  Temperature: 98 8 °F (37 1 °C) (18)  Temp Source: Oral (18)  Respirations: 16 (18)  Weight - Scale: 56 7 kg (125 lb) (18)  SpO2: 100 % (18)  No intake or output data in the 24 hours ending 18  Body mass index is 23 62 kg/m²  PHYSICAL EXAMS  General:  Patient is not in acute distress, laying in the bed comfortably, awake, alert responding to commands,   HEENT:  Both pupils normal-size atraumatic, normocephalic, nonicteric  Neck:  JVP not raised   Trachea central  Respiratory:  normal Breath sounds clear to auscultation,  Cardiovascular:  S1-S2 normal without any murmur   GI:  Abdomen soft marked tenderness right lower quadrant positive McBurney's point tenderness mild rebound no rigidity some guarding previous  scar  Musculoskeletal:  No back pain  Integument:  No skin rashes or ulceration  Lymphatic:  No cervical or inguinal lymphadenopathy  Neurologic:  Patient is awake alert, responding to command, well-oriented to time and place and person moving all extremities ambulating well    Lab Results: CBC:   Lab Results   Component Value Date    WBC 14 11 (H) 2018    HGB 13 6 2018    HCT 41 2 2018    MCV 90 2018     2018    MCH 29 8 2018    MCHC 33 0 2018    RDW 13 0 2018    MPV 10 4 2018    NRBC 0 2018   , CMP:   Lab Results   Component Value Date     2018     2018    CO2 29 2018    BUN 10 2018    CREATININE 0 87 2018    CALCIUM 8 7 2018    AST 21 2018    ALT 23 2018    ALKPHOS 84 2018    EGFR 78 2018   , Coagulation: No results found for: PT, INR, APTT, Urinalysis:   Lab Results   Component Value Date    COLORU Yellow 09/08/2018    CLARITYU Clear 09/08/2018    SPECGRAV <=1 005 09/08/2018    PHUR 6 5 09/08/2018    LEUKOCYTESUR Negative 09/08/2018    NITRITE Negative 09/08/2018    PROTEINUA Negative 09/08/2018    GLUCOSEU Negative 09/08/2018    KETONESU Trace (A) 09/08/2018    BILIRUBINUR Negative 09/08/2018    BLOODU Trace-Intact (A) 09/08/2018   , Amylase: No results found for: AMYLASE, Lipase:   Lab Results   Component Value Date    LIPASE 92 09/08/2018     Imaging: Ct Abdomen Pelvis With Contrast    Result Date: 9/8/2018  Narrative: CT ABDOMEN AND PELVIS WITH IV CONTRAST INDICATION:   RLQ pain, appendicitis suspected  COMPARISON:  None  TECHNIQUE:  CT examination of the abdomen and pelvis was performed  Axial, sagittal, and coronal 2D reformatted images were created from the source data and submitted for interpretation  Radiation dose length product (DLP) for this visit:  430 81 mGy-cm   This examination, like all CT scans performed in the [facility], was performed utilizing techniques to minimize radiation dose exposure, including the use of iterative  reconstruction and automated exposure control  IV Contrast:  100 mL of iohexol (OMNIPAQUE) Enteric Contrast:  Enteric contrast was not administered  FINDINGS: ABDOMEN LOWER CHEST:  No clinically significant abnormality identified in the visualized lower chest  LIVER/BILIARY TREE:  8 mm left liver hypodensity too small to otherwise accurately characterize  GALLBLADDER:  No calcified gallstones  No pericholecystic inflammatory change  SPLEEN:  Unremarkable  PANCREAS:  Unremarkable  ADRENAL GLANDS:  Unremarkable  KIDNEYS/URETERS:  Unremarkable  No hydronephrosis  STOMACH AND BOWEL:  Colon diverticula   APPENDIX:  Dilated fluid-filled appendix measuring 11 mm with multiple appendicoliths and mild periappendiceal fat infiltration, compatible with acute appendicitis  No loculated fluid collection  ABDOMINOPELVIC CAVITY:  No ascites or free intraperitoneal air  No lymphadenopathy  VESSELS:  Unremarkable for patient's age  PELVIS REPRODUCTIVE ORGANS:  Unremarkable for patient's age  URINARY BLADDER:  Unremarkable  ABDOMINAL WALL/INGUINAL REGIONS:  Unremarkable  OSSEOUS STRUCTURES:  No acute fracture or destructive osseous lesion  Lumbosacral spine degenerative change  Impression: 1  Dilated fluid-filled appendix measuring 11 mm with multiple appendicoliths and mild periappendiceal fat infiltration, compatible with acute appendicitis  No loculated fluid collection  I personally discussed this study with Niru Wilkes on 9/8/2018 at 8:25 PM  Workstation performed: [id number]      EKG, Pathology, and Other Studies: * No orders in the log *  VTE Pharmacologic Prophylaxis: Reason for no pharmacologic prophylaxis pre procedure  VTE Mechanical Prophylaxis: sequential compression device    Admitting Diagnosis: Abdominal pain [R10 9]  Assessment/Plan   Code Status: Level 1 - Full Code    Assessment:  Acute appendicitis With localized peritonitis  Plan:   NPO  IV fluids  IV antibiotics  Proper consent was obtained for the patient Risk and benefits explained to the patient in detail agreed for surgery  Emergent OR team was called for emergent laparoscopic possible open appendix    Counseling / Coordination of Care  Total floor / unit time spent today 40 minutes  Greater than 50% of total time was spent with the patient and / or family counseling and / or coordination of care

## 2018-09-09 NOTE — DISCHARGE SUMMARY
Discharge Summary - Surgical Shelley Urbina 50 y o  female MRN: [id number]    Tess Lee    Admission Date: 9/8/2018     Discharge Date: 9/9/2018  Admitting Provider: Arya Yanez MD  Discharge Provider: Arya Yanez MD  Primary Care Physician at Discharge: Makenna Jamison [phone]  Consulting Providers:  Hospitalist for medical management    Pre-op Diagnosis:   Acute appendicitis [K35 80]  Primary Discharge Diagnosis  Procedure(s):  APPENDECTOMY LAPAROSCOPIC    Patient Active Problem List   Diagnosis    Acute appendicitis       Hospital Course: Adelita salazar recovered well post surgery without any complication  Progressed to the regular diet and was discharged    Discharge To: Home Self Care  Discharge Disposition: Final discharge disposition not confirmed    Discharge instructions/Information to patient and family:   See after visit summary for information provided to patient and family  Allergies: Allergies   Allergen Reactions    Apple     Peach  [Prunus Persica]      Annotation - [id number]: peaches    Codeine Rash       Code Status: Level 1 - Full Code  Advance Directive and Living Will: <no information>  Power of :    POLST:    Imaging: Ct Abdomen Pelvis With Contrast    Result Date: 9/8/2018  Impression: 1  Dilated fluid-filled appendix measuring 11 mm with multiple appendicoliths and mild periappendiceal fat infiltration, compatible with acute appendicitis  No loculated fluid collection  I personally discussed this study with Guilherme Lopez on 9/8/2018 at 8:25 PM  Workstation performed: [id number]       Discharge  Statement   I spent 30 minutes discharging the patient  This time was spent on the day of discharge  I had direct contact with the patient on the day of discharge  DIET:   Resume your normal diet  Try to eat low fat and low cholesterol foods    DRESSING:   The dressing may be removed on the third day following surgery  Patient was then advised to follow Dr Neena RAMIREZ  at his office in one week's time  Patient was advised to call [phone] for appointment  Patient was advised to report fever above 101°F, excessive abdominal pain, excessive discharge from the incision, nausea, vomiting, jaundice or not feeling well

## 2018-09-09 NOTE — ANESTHESIA PREPROCEDURE EVALUATION
Review of Systems/Medical History  Patient summary reviewed  Chart reviewed  No history of anesthetic complications     Cardiovascular   Pulmonary       GI/Hepatic      Comment: Abdominal pain with n/v          Endo/Other     GYN      Comment: S/p tubal     Hematology   Musculoskeletal       Neurology   Psychology           Physical Exam    Airway    Mallampati score: II  TM Distance: >3 FB  Neck ROM: full     Dental       Cardiovascular  Rhythm: regular, Rate: normal,     Pulmonary  Breath sounds clear to auscultation,     Other Findings        Anesthesia Plan  ASA Score- 1 Emergent    Anesthesia Type- general with ASA Monitors  Additional Monitors:   Airway Plan: ETT  Plan Factors-    Induction- rapid sequence induction and intravenous  Postoperative Plan- Plan for postoperative opioid use  Planned trial extubation    Informed Consent- Anesthetic plan and risks discussed with patient

## 2018-09-09 NOTE — OP NOTE
General SurgeryAPPENDECTOMY LAPAROSCOPIC Op Note    Joselyn Pro  9/8/2018    Pre-op Diagnosis:   Acute appendicitis [K35 80]  Patient Active Problem List   Diagnosis    Acute appendicitis       Post-op Diagnosis:  Post-Op Diagnosis Codes:     * Acute appendicitis [K35 80]  Patient Active Problem List   Diagnosis    Acute appendicitis     PMH:  History reviewed  No pertinent past medical history  Procedure(s):  APPENDECTOMY LAPAROSCOPIC    Surgeon(s):  Soila Mckeon MD    [facility]  History reviewed  No pertinent past medical history  Anesthesia:  General    Assistant:  Ms Som Zhao RN  Services of RN was utilized as a first assistant as there is no surgical residency program at [facility]    Staff:   Circulator: Paulina Dee RN  Scrub Person: Clayton Foy RN  RN First Assistant: Ervin Tovar RN    Operative Findings:  Dilated covered inflamed appendix with fibrinous deposit over the appendix    Procedure:  Joselyn Pro was identified by me  Proper consent was obtained  The risks, benefits, alternatives,and probabilities of success were discussed in detail with no guarantee made as to outcome  All questions were answered to the patient's satisfaction  Site was marked  Prior coming to [address] [place] [address] was cleansed with ChloraPrep and draped in usual sterile fashion  Joselyn Pro was given pre-operative antibiotics  Body mass index is 23 62 kg/m²  Joselyn Pro is ASA 1E and Fire risk- 3  Joselyn Pro was re-identified in the OR  Site was identified and detailed timeout was performed with Anesthesia and OR staff  Joselyn Pro was given IV Cefmet as prophylactic antibiotics  Venodynes were placed, and Hoskins catheter was placed  After painting, draping, and time-out, infraumbilical transverse curvilinear incision was made  The skin and subcutaneous tissue was cut along the line of incision with open Mateus technique  A 10 mm port was placed   Other two ports were placed, one into the left lower quadrant in midclavicular line and one suprapubic under direct telescopic vision  The appendix was curved and there were multiple adhesions were markedly inflamed appendix was enlarged and completely covered with omentum  these adhesions which were carefully dissected with the Harmonic scalpel, mesoappendix was  cauterized and cut with the Harmonic scalpel and the base of the appendix  was stapled and cut with a cuff of cecum with ECHELON FLEX 60 Powered plus Stapler The appendix was then placed into Endo pouch and was delivered through the umbilical incision  Abdominal cavity was thoroughly lavaged with copious amounts of fluid for irrigation mixed with antibiotics particularly pelvis and right paracolic gutter and subhepatic space and subdiaphragmatic space were lavaged Base of appendix sites was re inspected staple line was intact and no active bleeding was noted  The 10 mm port site was closed in two layers with the figure-of-eight Vicryl, and the skin was approximated with subcuticular Monocryl 4-0  All the 5 mm ports were closed in single layer subcuticular Monocryl  All the ports were thoroughly infiltrated with Marcaine with epinephrine  Lauren Whittswapnil tolerated the procedure well, remain stable during and after the procedure  At the end of the procedure all the instruments, needle and sponge counts were noted to be correct  Sterile dresing was applied and patient was transfered to recovery area in stable condition  I was present for the entire procedure No qualified resident was available  A physician's assistant was required due to the intensity of the surgery  He is assistant was required for camera operation, hemostasis retraction and the closure of the surgical wound  Physician assistant was present during the entire procedure      Patient Disposition:  PACU     Estimated Blood Loss:  Minimal    Specimens:    Order Name Source Comment Collection Info Order Time   TISSUE EXAM Appendix Collected By: Genevieve Finch MD 9/8/2018 10:49 PM         Drains:  Urethral Catheter Latex 16 Fr   (Active)       Complications:  None          Genevieve Finch MD MS FRCS FACS  Date: 9/8/2018  Time: 11:07 PM  Copy to PCP: Silvia Doan MD

## 2018-09-09 NOTE — SOCIAL WORK
Per Surgery, pt stable for DC to home today  No DCP needs noted or requested  Pt departed prior to meeting with CM today

## 2018-09-09 NOTE — PLAN OF CARE
DISCHARGE PLANNING     Discharge to home or other facility with appropriate resources Progressing        GASTROINTESTINAL - ADULT     Maintains or returns to baseline bowel function Progressing     Maintains adequate nutritional intake Progressing        GENITOURINARY - ADULT     Absence of urinary retention Progressing        INFECTION - ADULT     Absence or prevention of progression during hospitalization Progressing        Knowledge Deficit     Patient/family/caregiver demonstrates understanding of disease process, treatment plan, medications, and discharge instructions Progressing        METABOLIC, FLUID AND ELECTROLYTES - ADULT     Electrolytes maintained within normal limits Progressing        MUSCULOSKELETAL - ADULT     Maintain or return mobility to safest level of function Progressing        PAIN - ADULT     Verbalizes/displays adequate comfort level or baseline comfort level Progressing        RESPIRATORY - ADULT     Achieves optimal ventilation and oxygenation Progressing        SKIN/TISSUE INTEGRITY - ADULT     Incision(s), wounds(s) or drain site(s) healing without S/S of infection Progressing

## 2018-09-09 NOTE — PROGRESS NOTES
Progress Note - General Surgery   Patient: Lauren Butler   : 1970 Sex: female MRN: [id number]   CSN: [id number] PCP: Cherylynn Dubin, MD  Unit/Bed#: [address] [place] [address]     SUBJECTIVE:   Minimal pain  OBJECTIVE  Abdominal soft incision nicely healing no bleeding  Vitals:   I/O last 24 hours: In: -   Out: 400 [Urine:400]Blood pressure (!) 89/56, pulse 61, temperature 98 1 °F (36 7 °C), temperature source Oral, resp  rate 16, height 5' 2" (1 575 m), weight 56 7 kg (125 lb), SpO2 96 %, not currently breastfeeding  ,Body mass index is 22 86 kg/m²    Invasive Devices     Peripheral Intravenous Line            Peripheral IV 18 Right Antecubital less than 1 day              Active medications:    Current Facility-Administered Medications:     ceFAZolin (ANCEF) IVPB (premix) 1,000 mg, 1,000 mg, Intravenous, Q8H    heparin (porcine) subcutaneous injection 5,000 Units, 5,000 Units, Subcutaneous, Q8H [facility], 5,000 Units at 18 0643    ketorolac (TORADOL) injection 15 mg, 15 mg, Intravenous, Q6H TOMASA, 15 mg at 18 0025    lactated ringers infusion, 100 mL/hr, Intravenous, Continuous, 100 mL/hr at 18 0634    metroNIDAZOLE (FLAGYL) IVPB (premix) 500 mg, 500 mg, Intravenous, Q8H, 500 mg at 18 0640    Physical Exam:   General Alert awake   Normocephalic atraumatic PERRLA  Lungs clear bilaterally  Cardiac normal S1 normal S2  Abdomen soft,non tender Bowel sounds present  Skin: surgical dressing is C/D/I  Ext: No clubbing, cyanosis, edema    Lab, Imaging and other studies:  Recent Results (from the past 24 hour(s))   CBC and differential    Collection Time: 18  7:05 PM   Result Value Ref Range    WBC 14 11 (H) 4 31 - 10 16 Thousand/uL    RBC 4 56 3 81 - 5 12 Million/uL    Hemoglobin 13 6 11 5 - 15 4 g/dL    Hematocrit 41 2 34 8 - 46 1 %    MCV 90 82 - 98 fL    MCH 29 8 26 8 - 34 3 pg    MCHC 33 0 31 4 - 37 4 g/dL    RDW 13 0 11 6 - 15 1 %    MPV 10 4 8 9 - 12 7 fL    Platelets 670 586 - 749 Thousands/uL    nRBC 0 /100 WBCs    Neutrophils Relative 88 (H) 43 - 75 %    Immat GRANS % 0 0 - 2 %    Lymphocytes Relative 7 (L) 14 - 44 %    Monocytes Relative 5 4 - 12 %    Eosinophils Relative 0 0 - 6 %    Basophils Relative 0 0 - 1 %    Neutrophils Absolute 12 33 (H) 1 85 - 7 62 Thousands/µL    Immature Grans Absolute 0 05 0 00 - 0 20 Thousand/uL    Lymphocytes Absolute 1 02 0 60 - 4 47 Thousands/µL    Monocytes Absolute 0 67 0 17 - 1 22 Thousand/µL    Eosinophils Absolute 0 01 0 00 - 0 61 Thousand/µL    Basophils Absolute 0 03 0 00 - 0 10 Thousands/µL   Comprehensive metabolic panel    Collection Time: 09/08/18  7:05 PM   Result Value Ref Range    Sodium 139 136 - 145 mmol/L    Potassium 3 7 3 5 - 5 3 mmol/L    Chloride 104 100 - 108 mmol/L    CO2 29 21 - 32 mmol/L    ANION GAP 6 4 - 13 mmol/L    BUN 10 5 - 25 mg/dL    Creatinine 0 87 0 60 - 1 30 mg/dL    Glucose 101 65 - 140 mg/dL    Calcium 8 7 8 3 - 10 1 mg/dL    AST 21 5 - 45 U/L    ALT 23 12 - 78 U/L    Alkaline Phosphatase 84 46 - 116 U/L    Total Protein 7 1 6 4 - 8 2 g/dL    Albumin 3 7 3 5 - 5 0 g/dL    Total Bilirubin 0 90 0 20 - 1 00 mg/dL    eGFR 78 ml/min/1 73sq m   Lipase    Collection Time: 09/08/18  7:05 PM   Result Value Ref Range    Lipase 92 73 - 393 u/L   Lactic acid, plasma    Collection Time: 09/08/18  7:05 PM   Result Value Ref Range    LACTIC ACID 1 3 0 5 - 2 0 mmol/L   hCG, quantitative    Collection Time: 09/08/18  7:05 PM   Result Value Ref Range    HCG, Quant 3 <=6 mIU/mL   UA w Reflex to Microscopic    Collection Time: 09/08/18  9:02 PM   Result Value Ref Range    Color, UA Yellow     Clarity, UA Clear     Specific Gravity, UA <=1 005 1 000 - 1 030    pH, UA 6 5 5 0 - 9 0    Leukocytes, UA Negative Negative    Nitrite, UA Negative Negative    Protein, UA Negative Negative mg/dl    Glucose, UA Negative Negative mg/dl    Ketones, UA Trace (A) Negative mg/dl    Urobilinogen, UA 0 2 0 2, 1 0 E U /dl E U /dl    Bilirubin, UA Negative Negative    Blood, UA Trace-Intact (A) Negative   Urine Microscopic    Collection Time: 09/08/18  9:02 PM   Result Value Ref Range    RBC, UA None Seen None Seen, 0-5 /hpf    WBC, UA None Seen None Seen, 0-5, 5-55, 5-65 /hpf    Epithelial Cells Occasional None Seen, Occasional /hpf    Bacteria, UA None Seen None Seen, Occasional /hpf   CBC and differential    Collection Time: 09/09/18  6:39 AM   Result Value Ref Range    WBC 12 02 (H) 4 31 - 10 16 Thousand/uL    RBC 3 95 3 81 - 5 12 Million/uL    Hemoglobin 11 8 11 5 - 15 4 g/dL    Hematocrit 36 4 34 8 - 46 1 %    MCV 92 82 - 98 fL    MCH 29 9 26 8 - 34 3 pg    MCHC 32 4 31 4 - 37 4 g/dL    RDW 13 0 11 6 - 15 1 %    MPV 10 2 8 9 - 12 7 fL    Platelets 394 451 - 716 Thousands/uL    nRBC 0 /100 WBCs    Neutrophils Relative 92 (H) 43 - 75 %    Immat GRANS % 0 0 - 2 %    Lymphocytes Relative 5 (L) 14 - 44 %    Monocytes Relative 3 (L) 4 - 12 %    Eosinophils Relative 0 0 - 6 %    Basophils Relative 0 0 - 1 %    Neutrophils Absolute 11 01 (H) 1 85 - 7 62 Thousands/µL    Immature Grans Absolute 0 04 0 00 - 0 20 Thousand/uL    Lymphocytes Absolute 0 57 (L) 0 60 - 4 47 Thousands/µL    Monocytes Absolute 0 39 0 17 - 1 22 Thousand/µL    Eosinophils Absolute 0 00 0 00 - 0 61 Thousand/µL    Basophils Absolute 0 01 0 00 - 0 10 Thousands/µL   Basic metabolic panel    Collection Time: 09/09/18  6:39 AM   Result Value Ref Range    Sodium 136 136 - 145 mmol/L    Potassium 4 1 3 5 - 5 3 mmol/L    Chloride 105 100 - 108 mmol/L    CO2 24 21 - 32 mmol/L    ANION GAP 7 4 - 13 mmol/L    BUN 10 5 - 25 mg/dL    Creatinine 0 82 0 60 - 1 30 mg/dL    Glucose 136 65 - 140 mg/dL    Calcium 7 7 (L) 8 3 - 10 1 mg/dL    eGFR 84 ml/min/1 73sq m     VTE Pharmacologic Prophylaxis: Heparin  VTE Mechanical Prophylaxis: sequential compression device       Diet Orders            Start     Ordered    09/09/18 1104  Room Service  Once     Question:  Type of Service  Answer:  Room Service-Appropriate 09/09/18 1103    09/09/18 0008  Diet Clear Liquid  Diet effective now     Question:  Diet Type  Answer:  Clear Liquid    09/09/18 0007        Admitting Diagnosis: Abdominal pain [R10 9]  Acute appendicitis [K35 80]  Code Status: Level 1 - Full Code  Patient Active Problem List   Diagnosis    Acute appendicitis     PT/OT/ST reviewed  Plan was coordinated with Nursing staff & Case management  Plan of care was discussed with patient in detail    Assessment/ Plan:  Maryanne Marr is a 50 y o  female 1 day(s) POD # 1st  S/p laparoscopic appendectomy for acute appendicitis    Pain control  Pulmonary hygiene  DVT prophylaxis  OOB/Ambulation    Counseling / Coordination of Care  Total floor / unit time spent today 30minutes  Greater than 50% of total time was spent with the patient and / or family counseling and / or coordination of care  Geovanna Keller MD MS FRCS FACS  09/09/18 11:54 AM    Portions of the record may have been created with voice recognition software  Occasional wrong word or "sound a like" substitutions may have occurred due to the inherent limitations of voice recognition software  Read the chart carefully and recognize, using context, where substitutions have occurred

## 2018-09-09 NOTE — NURSING NOTE
Patient discharged to home left via wheelchair with her daughter and her belongings  Iv access removed prior to leaving  Discharge instructions and education given to patient and discussed  Script called into pharmacy  Vaccines not indicated

## 2018-09-10 LAB
BACTERIA UR CULT: NORMAL
MRSA NOSE QL CULT: NORMAL

## 2018-09-10 NOTE — CASE MANAGEMENT
Initial Clinical Review    Admission: Date/Time/Statement: 9/8/18 @ 2128     Orders Placed This Encounter   Procedures    Place in Observation (expected length of stay for this patient is less than two midnights)     Standing Status:   Standing     Number of Occurrences:   1     Order Specific Question:   Admitting Physician     Answer:   Deandre Dawkins     Order Specific Question:   Level of Care     Answer:   Med Surg [16]    Inpatient Admission     Standing Status:   Standing     Number of Occurrences:   1     Order Specific Question:   Admitting Physician     Answer:   Deandre aDwkins     Order Specific Question:   Level of Care     Answer:   Med Surg [16]     Order Specific Question:   Estimated length of stay     Answer:   More than 2 Midnights     Order Specific Question:   Certification     Answer:   I certify that inpatient services are medically necessary for this patient for a duration of greater than two midnights  See H&P and MD Progress Notes for additional information about the patient's course of treatment           ED: Date/Time/Mode of Arrival:   ED Arrival Information     Expected Arrival Acuity Means of Arrival Escorted By Service Admission Type    - 9/8/2018 17:37 Urgent Walk-In Family Member Surgery-General Urgent    Arrival Complaint    Abdominal Pain          Chief Complaint:   Chief Complaint   Patient presents with    Abdominal Pain     started with upper abd pain earlier today, now more generalized, vomiting, diarrhea with fever and chills       History of Illness: Lauro Yoon is a 50 y o  female who presents with 1 day duration of abdominal pain patient has similar type of pain 2 months ago a thought may be appendicitis but it got better  Denies any fever chills rigors as some nausea 1 episode of vomiting some anorexia  Pain was getting progressively was came to the ER workup with the blood work and CT scan revealed acute appendicitis Surgical consult was obtained  And patient was taken on emergent basis for surgical laparoscopic possible open appendectomy    ED Vital Signs:   ED Triage Vitals [09/08/18 1751]   Temperature Pulse Respirations Blood Pressure SpO2   99 7 °F (37 6 °C) 76 16 125/69 98 %      Temp Source Heart Rate Source Patient Position - Orthostatic VS BP Location FiO2 (%)   Tympanic Monitor Sitting Right arm --      Pain Score       Worst Possible Pain        Wt Readings from Last 1 Encounters:   09/08/18 56 7 kg (125 lb)       Vital Signs (abnormal): Abnormal Labs/Diagnostic Test Results: WBC 14 11   CT ABDOMEN IMPRESSION:  1  Dilated fluid-filled appendix measuring 11 mm with multiple appendicoliths and mild periappendiceal fat infiltration, compatible with acute appendicitis  No loculated fluid collection        ED Treatment:   Medication Administration from 09/08/2018 1737 to 09/08/2018 2130       Date/Time Order Dose Route Action Action by Comments     09/08/2018 1942 sodium chloride 0 9 % bolus 1,000 mL 1,000 mL Intravenous Fady 37 Darryn Carrion RN      09/08/2018 1908 ondansetron (ZOFRAN) injection 4 mg 4 mg Intravenous Given Darryn Carrion RN      09/08/2018 1908 morphine (PF) 4 mg/mL injection 4 mg 4 mg Intravenous Given Darryn Carrion RN      09/08/2018 2013 iohexol (OMNIPAQUE) 350 MG/ML injection (MULTI-DOSE) 100 mL 100 mL Intravenous Given Pastora Pizarro      09/08/2018 2034 ceFAZolin (ANCEF) IVPB (premix) 1,000 mg 1,000 mg Intravenous New Bag Raina Mcleod RN      09/08/2018 2130 metroNIDAZOLE (FLAGYL) IVPB (premix) 500 mg   Intravenous MAR Hold Automatic Transfer Provider      09/08/2018 2107 ondansetron (ZOFRAN) injection 4 mg 4 mg Intravenous Given Raina Mcleod RN           Past Medical/Surgical History:    Active Ambulatory Problems     Diagnosis Date Noted    No Active Ambulatory Problems     Resolved Ambulatory Problems     Diagnosis Date Noted    No Resolved Ambulatory Problems     No Additional Past Medical History Admitting Diagnosis: Abdominal pain [R10 9]  Acute appendicitis [K35 80]    Age/Sex: 50 y o  female    Assessment/Plan:   Assessment:  Acute appendicitis With localized peritonitis  Plan:   NPO  IV fluids  IV antibiotics  Proper consent was obtained for the patient Risk and benefits explained to the patient in detail agreed for surgery  Emergent OR team was called for emergent laparoscopic possible open appendix       OR  Procedure(s):  APPENDECTOMY LAPAROSCOPIC  Operative Findings:  Dilated covered inflamed appendix with fibrinous deposit over the appendix    Admission Orders:  INPATIENT   ACUTE LOC  OR  NPO  [email or web address]  IV ANCEF Q8HR  IV TORADOL Q6HR  IV FLAGYL   IV MORPHINE  IV ZOFRAN X2    Thank you,  [address] [place] [address] Utilization Review Department  Phone: [phone]; Fax [phone]  ATTENTION: Please call with any questions or concerns to [phone]  and carefully follow the prompts so that you are directed to the right person  Send all requests for admission clinical reviews, approved or denied determinations and any other requests to fax [phone]   All voicemails are confidential

## 2018-09-10 NOTE — ED PROVIDER NOTES
History  Chief Complaint   Patient presents with    Abdominal Pain     started with upper abd pain earlier today, now more generalized, vomiting, diarrhea with fever and chills     47F c/o upper abd pain now worse in the RLQ  +n/v/d   +fevers  No urinary sxs  None       History reviewed  No pertinent past medical history  History reviewed  No pertinent surgical history  History reviewed  No pertinent family history  I have reviewed and agree with the history as documented  Social History   Substance Use Topics    Smoking status: Former Smoker    Smokeless tobacco: Never Used    Alcohol use Yes      Comment: occasional        Review of Systems   Constitutional: Negative for fever  Respiratory: Negative for cough  Gastrointestinal: Positive for abdominal pain  Musculoskeletal: Negative for back pain  Neurological: Negative for headaches  All other systems reviewed and are negative  Physical Exam  Physical Exam   Constitutional: She is oriented to person, place, and time  She appears well-developed  HENT:   Mouth/Throat: Oropharynx is clear and moist    Eyes: Conjunctivae are normal    Neck: Neck supple  Cardiovascular: Normal rate and regular rhythm  Pulmonary/Chest: Effort normal and breath sounds normal    Abdominal: Soft  Bowel sounds are normal  There is tenderness (RLQ)  Neurological: She is alert and oriented to person, place, and time  Skin: Skin is warm and dry  Psychiatric: She has a normal mood and affect  Vitals reviewed        Vital Signs  ED Triage Vitals [09/08/18 1751]   Temperature Pulse Respirations Blood Pressure SpO2   99 7 °F (37 6 °C) 76 16 125/69 98 %      Temp Source Heart Rate Source Patient Position - Orthostatic VS BP Location FiO2 (%)   Tympanic Monitor Sitting Right arm --      Pain Score       Worst Possible Pain           Vitals:    09/09/18 0622 09/09/18 0710 09/09/18 0936 09/09/18 1225   BP: (!) 87/50 (!) 89/52 (!) 89/56 100/59 Pulse: 71 61  55   Patient Position - Orthostatic VS: Lying Lying Lying Lying       Visual Acuity      ED Medications  Medications   sodium chloride 0 9 % bolus 1,000 mL (0 mL Intravenous Stopped 9/9/18 1224)   ondansetron (ZOFRAN) injection 4 mg (4 mg Intravenous Given 9/8/18 1908)   morphine (PF) 4 mg/mL injection 4 mg (4 mg Intravenous Given 9/8/18 1908)   iohexol (OMNIPAQUE) 350 MG/ML injection (MULTI-DOSE) 100 mL (100 mL Intravenous Given 9/8/18 2013)   ceFAZolin (ANCEF) IVPB (premix) 1,000 mg (0 mg Intravenous Stopped 9/9/18 1223)   metroNIDAZOLE (FLAGYL) IVPB (premix) 500 mg (500 mg Intravenous Given 9/8/18 2213)   ondansetron (ZOFRAN) injection 4 mg (4 mg Intravenous Given 9/8/18 2107)       Diagnostic Studies  Results Reviewed     Procedure Component Value Units Date/Time    Urine Microscopic [[id number]]  (Normal) Collected:  09/08/18 2102    Lab Status:  Final result Specimen:  Urine from Urine, Clean Catch Updated:  09/08/18 2118     RBC, UA None Seen /hpf      WBC, UA None Seen /hpf      Epithelial Cells Occasional /hpf      Bacteria, UA None Seen /hpf     UA w Reflex to Microscopic [[id number]]  (Abnormal) Collected:  09/08/18 2102    Lab Status:  Final result Specimen:  Urine from Urine, Clean Catch Updated:  09/08/18 2109     Color, UA Yellow     Clarity, UA Clear     Specific Gravity, UA <=1 005     pH, UA 6 5     Leukocytes, UA Negative     Nitrite, UA Negative     Protein, UA Negative mg/dl      Glucose, UA Negative mg/dl      Ketones, UA Trace (A) mg/dl      Urobilinogen, UA 0 2 E U /dl      Bilirubin, UA Negative     Blood, UA Trace-Intact (A)    Urine culture [[id number]] Collected:  09/08/18 2102    Lab Status:   In process Specimen:  Urine from Urine, Clean Catch Updated:  09/08/18 2105    hCG, quantitative [[id number]]  (Normal) Collected:  09/08/18 1905    Lab Status:  Final result Specimen:  Blood Updated:  09/08/18 1953     HCG, Quant 3 mIU/mL     Narrative:          Expected Ranges: Approximate               Approximate HCG  Gestation age          Concentration ( mIU/mL)  _____________          ______________________   Francisco Gloria                      HCG values  0 2-1                       5-50  1-2                           2-3                         100-5000  3-4                         500-52364  4-5                         1000-55358  5-6                         [id number]  6-8                         69037-657158  8-12                        [id number]    Comprehensive metabolic panel [[id number]] Collected:  09/08/18 1905    Lab Status:  Final result Specimen:  Blood from Arm, Right Updated:  09/08/18 1948     Sodium 139 mmol/L      Potassium 3 7 mmol/L      Chloride 104 mmol/L      CO2 29 mmol/L      ANION GAP 6 mmol/L      BUN 10 mg/dL      Creatinine 0 87 mg/dL      Glucose 101 mg/dL      Calcium 8 7 mg/dL      AST 21 U/L      ALT 23 U/L      Alkaline Phosphatase 84 U/L      Total Protein 7 1 g/dL      Albumin 3 7 g/dL      Total Bilirubin 0 90 mg/dL      eGFR 78 ml/min/1 73sq m     Narrative:         National Kidney Disease Education Program recommendations are as follows:  GFR calculation is accurate only with a steady state creatinine  Chronic Kidney disease less than 60 ml/min/1 73 sq  meters  Kidney failure less than 15 ml/min/1 73 sq  meters  Lipase [[id number]]  (Normal) Collected:  09/08/18 1905    Lab Status:  Final result Specimen:  Blood from Arm, Right Updated:  09/08/18 1948     Lipase 92 u/L     Lactic acid, plasma [[id number]]  (Normal) Collected:  09/08/18 1905    Lab Status:  Final result Specimen:  Blood from Arm, Right Updated:  09/08/18 1946     LACTIC ACID 1 3 mmol/L     Narrative:         Result may be elevated if tourniquet was used during collection      CBC and differential [[id number]]  (Abnormal) Collected:  09/08/18 1905    Lab Status:  Final result Specimen:  Blood from Arm, Right Updated:  09/08/18 1923     WBC 14 11 (H) Thousand/uL      RBC 4 56 Million/uL      Hemoglobin 13 6 g/dL      Hematocrit 41 2 %      MCV 90 fL      MCH 29 8 pg      MCHC 33 0 g/dL      RDW 13 0 %      MPV 10 4 fL      Platelets 905 Thousands/uL      nRBC 0 /100 WBCs      Neutrophils Relative 88 (H) %      Immat GRANS % 0 %      Lymphocytes Relative 7 (L) %      Monocytes Relative 5 %      Eosinophils Relative 0 %      Basophils Relative 0 %      Neutrophils Absolute 12 33 (H) Thousands/µL      Immature Grans Absolute 0 05 Thousand/uL      Lymphocytes Absolute 1 02 Thousands/µL      Monocytes Absolute 0 67 Thousand/µL      Eosinophils Absolute 0 01 Thousand/µL      Basophils Absolute 0 03 Thousands/µL                  CT abdomen pelvis with contrast   Final Result by Esther Richards MD (09/08 2025)   1  Dilated fluid-filled appendix measuring 11 mm with multiple appendicoliths and mild periappendiceal fat infiltration, compatible with acute appendicitis  No loculated fluid collection  I personally discussed this study with Jasmin Singleton on 9/8/2018 at 8:25 PM                      Workstation performed: [id number]                    Procedures  Procedures       Phone Contacts  ED Phone Contact    ED Course                               MDM  Number of Diagnoses or Management Options  Acute appendicitis:   Diagnosis management comments: CT proven appendicitis  Ancef / flagyl per Dr Rico Valdovinos who will take to the OR now         CritCare Time    Disposition  Final diagnoses:   Acute appendicitis     Time reflects when diagnosis was documented in both MDM as applicable and the Disposition within this note     Time User Action Codes Description Comment    9/8/2018  8:30 PM Birtha Mix Add [K35 80] Acute appendicitis     9/8/2018 10:49 PM David Acevedo Modify [K35 80] Acute appendicitis     9/9/2018  6:31 AM Ismael August Modify [K35 80] Acute appendicitis       ED Disposition     ED Disposition Condition Comment    Admit  Case was discussed with Dr Rico Valdovinos and the patient's admission status was agreed to be Admission Status: observation status to the service of Dr Maurisio Patiño          Follow-up Information     Follow up With Specialties Details Why Contact Info    Niki Singleton MD Family Medicine Follow up in 2 week(s)  [address] [facility]      Balwinder Joyner MD General Surgery, Wound Care Follow up in 1 week(s)  [address] [facility] [place]  [phone]            Discharge Medication List as of 9/9/2018 12:13 PM      START taking these medications    Details   ibuprofen (ADVIL,MOTRIN) 100 MG chewable tablet Chew 1 tablet (100 mg total) every 8 (eight) hours as needed for mild pain, Starting Sun 9/9/2018, Normal             Outpatient Discharge Orders  Discharge Diet     Ice to affected area     Activity as tolerated     No strenuous exercise     Call provider for:  severe uncontrolled pain     Call provider for:  redness, tenderness, or signs of infection (pain, swelling, redness, odor or green/yellow discharge around incision site)     Call provider for: active or persistent bleeding     Call provider for:  difficulty breathing, headache or visual disturbances         ED Provider  Electronically Signed by           Liz Handy DO  09/09/18 9456

## 2018-09-12 NOTE — CASE MANAGEMENT
Auth:    [id number]      Notification of Discharge  This is a Notification of Discharge from our facility [address]  Please be advised that this patient has been discharge from our facility  Below you will find the admission and discharge date and time including the patients disposition  PRESENTATION DATE: 9/8/2018  6:12 PM  IP ADMISSION DATE: 9/8/18 2128  DISCHARGE DATE: 9/9/2018  3:21 PM  DISPOSITION: Home/Self Care    [address] [facility] in the [facility] Utilization Review Department  Phone: [phone]; Fax [phone]  ATTENTION: The Network Utilization Review Department is now centralized for our 9 Facilities  Make a note that we have a new phone and fax numbers for our Department  Please call with any questions or concerns to [phone] and carefully follow the prompts so that you are directed to the right person  All voicemails are confidential  Fax any determinations, approvals, denials, and requests for initial or continue stay review clinical to [phone]  Due to HIGH CALL volume, it would be easier if you could please send faxed requests to expedite your requests and in part, help us provide discharge notifications faster

## 2018-09-19 NOTE — CASE MANAGEMENT
Auth:    [id number]    Johny Julien RN Registered Nurse Addendum   Case Management Date of Service: 9/9/2018  3:21 PM         []Hide copied text  Initial Clinical Review     Admission: Date/Time/Statement: 9/8/18 @ 2128      Orders Placed This Encounter   Procedures    Place in Observation (expected length of stay for this patient is less than two midnights)       Standing Status:   Standing       Number of Occurrences:   1       Order Specific Question:   Admitting Physician       Answer:   Kylee Veloz       Order Specific Question:   Level of Care       Answer:   Med Surg [16]    Inpatient Admission       Standing Status:   Standing       Number of Occurrences:   1       Order Specific Question:   Admitting Physician       Answer:   Kylee Veloz       Order Specific Question:   Level of Care       Answer:   Med Surg [16]       Order Specific Question:   Estimated length of stay       Answer:   More than 2 Midnights       Order Specific Question:   Certification       Answer:   I certify that inpatient services are medically necessary for this patient for a duration of greater than two midnights   See H&P and MD Progress Notes for additional information about the patient's course of treatment             ED: Date/Time/Mode of Arrival:             ED Arrival Information      Expected Arrival Acuity Means of Arrival Escorted By Service Admission Type     - 9/8/2018 17:37 Urgent [facility]In [facility] Surgery-General Urgent     Arrival Complaint     Abdominal Pain             Chief Complaint:        Chief Complaint   Patient presents with    Abdominal Pain       started with upper abd pain earlier today, now more generalized, vomiting, diarrhea with fever and chills         History of Illness: Cristal Downing is a 50 y o  female who presents with 1 day duration of abdominal pain patient has similar type of pain 2 months ago a thought may be appendicitis but it got better  Denies any fever chills rigors as some nausea 1 episode of vomiting some anorexia  Pain was getting progressively was came to the ER workup with the blood work and CT scan revealed acute appendicitis Surgical consult was obtained  And patient was taken on emergent basis for surgical laparoscopic possible open appendectomy     ED Vital Signs:            ED Triage Vitals [09/08/18 1751]   Temperature Pulse Respirations Blood Pressure SpO2   99 7 °F (37 6 °C) 76 16 125/69 98 %       Temp Source Heart Rate Source Patient Position - Orthostatic VS BP Location FiO2 (%)   Tympanic Monitor Sitting Right arm --       Pain Score           Worst Possible Pain                Wt Readings from Last 1 Encounters:   09/08/18 56 7 kg (125 lb)         Vital Signs (abnormal):      Abnormal Labs/Diagnostic Test Results: WBC 14 11   CT ABDOMEN IMPRESSION:  1   Dilated fluid-filled appendix measuring 11 mm with multiple appendicoliths and mild periappendiceal fat infiltration, compatible with acute appendicitis   No loculated fluid collection         ED Treatment:              Medication Administration from 09/08/2018 1737 to 09/08/2018 2130        Date/Time Order Dose Route Action Action by Comments       09/08/2018 1942 sodium chloride 0 9 % bolus 1,000 mL 1,000 mL Intravenous Fady 37 Lazaro Shepherd RN         09/08/2018 1908 ondansetron (ZOFRAN) injection 4 mg 4 mg Intravenous Given Lazaro Shepherd RN         09/08/2018 1908 morphine (PF) 4 mg/mL injection 4 mg 4 mg Intravenous Given Lazaro Shepherd RN         09/08/2018 2013 iohexol (OMNIPAQUE) 350 MG/ML injection (MULTI-DOSE) 100 mL 100 mL Intravenous Given Loretta Pizarro         09/08/2018 2034 ceFAZolin (ANCEF) IVPB (premix) 1,000 mg 1,000 mg Intravenous New Bag Jaylon Amezcua RN         09/08/2018 2130 metroNIDAZOLE (FLAGYL) IVPB (premix) 500 mg   Intravenous MAR Hold Automatic Transfer Provider         09/08/2018 2107 ondansetron (ZOFRAN) injection 4 mg 4 mg Intravenous Given Jaylon Amezcua RN               Past Medical/Surgical History: Active Ambulatory Problems     Diagnosis Date Noted    No Active Ambulatory Problems           Resolved Ambulatory Problems     Diagnosis Date Noted    No Resolved Ambulatory Problems      No Additional Past Medical History         Admitting Diagnosis: Abdominal pain [R10 9]  Acute appendicitis [K35 80]     Age/Sex: 50 y o  female     Assessment/Plan:   Assessment:  Acute appendicitis With localized peritonitis  Plan:   NPO  IV fluids  IV antibiotics  Proper consent was obtained for the patient Risk and benefits explained to the patient in detail agreed for surgery  Emergent OR team was called for emergent laparoscopic possible open appendix        OR  Procedure(s):  APPENDECTOMY LAPAROSCOPIC  Operative Findings:  Dilated covered inflamed appendix with fibrinous deposit over the appendix     Admission Orders:  INPATIENT   ACUTE LOC  OR  NPO  [email or web address]  IV ANCEF Q8HR  IV TORADOL Q6HR  IV FLAGYL   IV MORPHINE  IV ZOFRAN X2     Thank you,  [address] [place] [address] Utilization Review Department  Phone: [phone]; Fax [phone]  ATTENTION: Please call with any questions or concerns to [phone]  and carefully follow the prompts so that you are directed to the right person  Send all requests for admission clinical reviews, approved or denied determinations and any other requests to fax [phone]   All voicemails are confidential           Revision History

## 2019-06-19 ENCOUNTER — TRANSCRIBE ORDERS (OUTPATIENT)
Dept: ADMINISTRATIVE | Facility: HOSPITAL | Age: 49
End: 2019-06-19

## 2019-06-19 DIAGNOSIS — Z12.39 BREAST SCREENING, UNSPECIFIED: Primary | ICD-10-CM

## 2019-07-17 ENCOUNTER — HOSPITAL ENCOUNTER (OUTPATIENT)
Dept: RADIOLOGY | Facility: HOSPITAL | Age: 49
Discharge: HOME | End: 2019-07-17
Attending: OBSTETRICS & GYNECOLOGY
Payer: COMMERCIAL

## 2019-07-17 VITALS — BODY MASS INDEX: 23.6 KG/M2 | WEIGHT: 125 LBS | HEIGHT: 61 IN

## 2019-07-17 DIAGNOSIS — Z12.39 BREAST SCREENING, UNSPECIFIED: ICD-10-CM

## 2019-07-17 PROCEDURE — 77067 SCR MAMMO BI INCL CAD: CPT

## 2019-07-17 PROCEDURE — 77063 BREAST TOMOSYNTHESIS BI: CPT

## 2020-07-30 ENCOUNTER — TRANSCRIBE ORDERS (OUTPATIENT)
Dept: ADMINISTRATIVE | Facility: HOSPITAL | Age: 50
End: 2020-07-30

## 2020-07-30 DIAGNOSIS — Z12.31 SCREENING MAMMOGRAM FOR HIGH-RISK PATIENT: Primary | ICD-10-CM

## 2020-09-25 ENCOUNTER — HOSPITAL ENCOUNTER (OUTPATIENT)
Dept: RADIOLOGY | Facility: HOSPITAL | Age: 50
Discharge: HOME | End: 2020-09-25
Attending: OBSTETRICS & GYNECOLOGY
Payer: COMMERCIAL

## 2020-09-25 VITALS — BODY MASS INDEX: 23.6 KG/M2 | WEIGHT: 125 LBS | HEIGHT: 61 IN

## 2020-09-25 DIAGNOSIS — Z12.31 SCREENING MAMMOGRAM FOR HIGH-RISK PATIENT: ICD-10-CM

## 2020-09-25 PROCEDURE — 77067 SCR MAMMO BI INCL CAD: CPT

## 2020-09-25 PROCEDURE — 77063 BREAST TOMOSYNTHESIS BI: CPT

## 2020-11-06 ENCOUNTER — OFFICE VISIT (OUTPATIENT)
Dept: URGENT CARE | Facility: CLINIC | Age: 50
End: 2020-11-06
Payer: COMMERCIAL

## 2020-11-06 VITALS — OXYGEN SATURATION: 99 % | TEMPERATURE: 98.7 F | RESPIRATION RATE: 16 BRPM | HEART RATE: 75 BPM

## 2020-11-06 DIAGNOSIS — Z20.822 EXPOSURE TO COVID-19 VIRUS: Primary | ICD-10-CM

## 2020-11-06 PROCEDURE — U0003 INFECTIOUS AGENT DETECTION BY NUCLEIC ACID (DNA OR RNA); SEVERE ACUTE RESPIRATORY SYNDROME CORONAVIRUS 2 (SARS-COV-2) (CORONAVIRUS DISEASE [COVID-19]), AMPLIFIED PROBE TECHNIQUE, MAKING USE OF HIGH THROUGHPUT TECHNOLOGIES AS DESCRIBED BY CMS-2020-01-R: HCPCS | Performed by: PHYSICIAN ASSISTANT

## 2020-11-06 PROCEDURE — 99213 OFFICE O/P EST LOW 20 MIN: CPT | Performed by: PHYSICIAN ASSISTANT

## 2020-11-06 RX ORDER — VALACYCLOVIR HYDROCHLORIDE 500 MG/1
TABLET, FILM COATED ORAL
COMMUNITY
Start: 2020-09-25

## 2020-11-06 RX ORDER — SODIUM, POTASSIUM,MAG SULFATES 17.5-3.13G
SOLUTION, RECONSTITUTED, ORAL ORAL
COMMUNITY
Start: 2020-11-05

## 2020-11-08 LAB — SARS-COV-2 RNA SPEC QL NAA+PROBE: NOT DETECTED

## 2020-11-09 ENCOUNTER — TELEPHONE (OUTPATIENT)
Dept: URGENT CARE | Facility: CLINIC | Age: 50
End: 2020-11-09

## 2020-11-10 ENCOUNTER — TELEPHONE (OUTPATIENT)
Dept: URGENT CARE | Facility: CLINIC | Age: 50
End: 2020-11-10

## 2020-11-13 PROBLEM — Z20.822 EXPOSURE TO COVID-19 VIRUS: Status: ACTIVE | Noted: 2020-11-13

## 2021-10-11 ENCOUNTER — HOSPITAL ENCOUNTER (OUTPATIENT)
Dept: RADIOLOGY | Facility: HOSPITAL | Age: 51
Discharge: HOME | End: 2021-10-11
Payer: COMMERCIAL

## 2021-10-11 DIAGNOSIS — Z12.31 ENCOUNTER FOR SCREENING MAMMOGRAM FOR BREAST CANCER: ICD-10-CM

## 2021-10-11 PROCEDURE — 77067 SCR MAMMO BI INCL CAD: CPT

## 2021-10-11 PROCEDURE — 77063 BREAST TOMOSYNTHESIS BI: CPT

## 2021-10-15 ENCOUNTER — HOSPITAL ENCOUNTER (OUTPATIENT)
Dept: RADIOLOGY | Facility: HOSPITAL | Age: 51
Discharge: HOME | End: 2021-10-15
Payer: COMMERCIAL

## 2021-10-15 DIAGNOSIS — R92.8 ABNORMAL SCREENING MAMMOGRAM: ICD-10-CM

## 2021-10-15 PROCEDURE — 77065 DX MAMMO INCL CAD UNI: CPT

## 2021-10-25 ENCOUNTER — HOSPITAL ENCOUNTER (OUTPATIENT)
Dept: RADIOLOGY | Facility: HOSPITAL | Age: 51
Discharge: HOME | End: 2021-10-25

## 2021-10-25 ENCOUNTER — HOSPITAL ENCOUNTER (OUTPATIENT)
Dept: RADIOLOGY | Facility: HOSPITAL | Age: 51
Discharge: HOME | End: 2021-10-25
Payer: COMMERCIAL

## 2021-10-25 VITALS — SYSTOLIC BLOOD PRESSURE: 126 MMHG | DIASTOLIC BLOOD PRESSURE: 70 MMHG

## 2021-10-25 DIAGNOSIS — R92.8 ABNORMAL FINDINGS ON DIAGNOSTIC IMAGING OF BREAST: ICD-10-CM

## 2021-10-25 PROCEDURE — 19081 BX BREAST 1ST LESION STRTCTC: CPT

## 2021-10-25 PROCEDURE — 88305 TISSUE EXAM BY PATHOLOGIST: CPT | Performed by: PATHOLOGY

## 2021-10-25 PROCEDURE — A4648 IMPLANTABLE TISSUE MARKER: HCPCS

## 2021-10-25 RX ORDER — LIDOCAINE HYDROCHLORIDE 10 MG/ML
5 INJECTION, SOLUTION EPIDURAL; INFILTRATION; INTRACAUDAL; PERINEURAL ONCE
Status: COMPLETED | OUTPATIENT
Start: 2021-10-25 | End: 2021-10-25

## 2021-10-25 RX ORDER — LIDOCAINE HYDROCHLORIDE AND EPINEPHRINE 10; 10 MG/ML; UG/ML
10 INJECTION, SOLUTION INFILTRATION; PERINEURAL ONCE
Status: COMPLETED | OUTPATIENT
Start: 2021-10-25 | End: 2021-10-25

## 2021-10-25 RX ADMIN — LIDOCAINE HYDROCHLORIDE 5 ML: 10 INJECTION, SOLUTION EPIDURAL; INFILTRATION; INTRACAUDAL; PERINEURAL at 11:38

## 2021-10-25 RX ADMIN — LIDOCAINE HYDROCHLORIDE AND EPINEPHRINE 10 ML: 10; 10 INJECTION, SOLUTION INFILTRATION; PERINEURAL at 11:40

## 2021-10-27 ENCOUNTER — TELEPHONE (OUTPATIENT)
Dept: RADIOLOGY | Facility: HOSPITAL | Age: 51
End: 2021-10-27

## 2022-12-02 ENCOUNTER — HOSPITAL ENCOUNTER (OUTPATIENT)
Dept: RADIOLOGY | Facility: HOSPITAL | Age: 52
Discharge: HOME | End: 2022-12-02

## 2022-12-02 VITALS — HEIGHT: 61 IN | WEIGHT: 123 LBS | BODY MASS INDEX: 23.22 KG/M2

## 2022-12-02 DIAGNOSIS — Z12.31 ENCOUNTER FOR SCREENING MAMMOGRAM FOR MALIGNANT NEOPLASM OF BREAST: ICD-10-CM

## 2023-03-09 ENCOUNTER — HOSPITAL ENCOUNTER (OUTPATIENT)
Dept: RADIOLOGY | Facility: HOSPITAL | Age: 53
Discharge: HOME | End: 2023-03-09

## 2023-03-09 DIAGNOSIS — R92.2 DENSE BREAST TISSUE: ICD-10-CM

## (undated) DEVICE — LATEX FREE 10 FT  INSUFFLATION TUBING, COLDER CONNECTOR WITH 1 MICRON FILTER STERILE ONE TIME USE, 20 U: Brand: SURGICAL DIRECT

## (undated) DEVICE — ENDOPATH XCEL BLADELESS TROCARS WITH STABILITY SLEEVES: Brand: ENDOPATH XCEL

## (undated) DEVICE — BASIC DOUBLE BASIN 2-LF: Brand: MEDLINE INDUSTRIES, INC.

## (undated) DEVICE — ANTI-FOG SOLUTION WITH FOAM PAD: Brand: DEVON

## (undated) DEVICE — ENDOPATH ECHELON VASCULAR  RELOADS, WHITE, 35MM: Brand: ECHELON ENDOPATH

## (undated) DEVICE — 3M™ TEGADERM™ TRANSPARENT FILM DRESSING FRAME STYLE, 1628, 6 IN X 8 IN (15 CM X 20 CM), 10/CT 8CT/CASE: Brand: 3M™ TEGADERM™

## (undated) DEVICE — SUT MONOCRYL 4-0 PC-5 18 IN Y823G

## (undated) DEVICE — PACK GENERAL LF

## (undated) DEVICE — CHLORAPREP HI-LITE 26ML ORANGE

## (undated) DEVICE — ECHELON FLEX  POWERED VASCULAR STAPLER WITH ADVANCED PLACEMENT TIP, 35MM: Brand: ECHELON FLEX

## (undated) DEVICE — ASTOUND STANDARD SURGICAL GOWN, XL: Brand: CONVERTORS

## (undated) DEVICE — SUT VICRYL PLUS 0 CT-3 27 IN VCP329H

## (undated) DEVICE — DRAPE LAPAROTOMY W/POUCHES

## (undated) DEVICE — GROUNDING PAD UNIVERSAL SLW

## (undated) DEVICE — SPONGE GAUZE 4 X 8 12 PLY STRL LF

## (undated) DEVICE — LABEL STERILE (RSC) (2-SENSOR CAINE 2-LIDOCAINE 2-HEPARIN)

## (undated) DEVICE — ENDOPATH XCEL UNIVERSAL TROCAR STABLILITY SLEEVES: Brand: ENDOPATH XCEL

## (undated) DEVICE — SCD SEQUENTIAL COMPRESSION COMFORT SLEEVE MEDIUM KNEE LENGTH: Brand: KENDALL SCD

## (undated) DEVICE — 3M™ STERI-STRIP™ REINFORCED ADHESIVE SKIN CLOSURES, R1547, 1/2 IN X 4 IN (12 MM X 100 MM), 6 STRIPS/ENVELOPE: Brand: 3M™ STERI-STRIP™

## (undated) DEVICE — ADHESIVE SKN CLSR HISTOACRYL FLEX 0.5ML LF

## (undated) DEVICE — ENDOPOUCH RETRIEVER SPECIMEN RETRIEVAL BAGS: Brand: ENDOPOUCH RETRIEVER

## (undated) DEVICE — 5 MM BABCOCKS WITH RATCHET HANDLES: Brand: ENDOPATH

## (undated) DEVICE — IRRIG ENDO FLO TUBING

## (undated) DEVICE — WAND INSULSCAN TESTING

## (undated) DEVICE — TROCARS: Brand: KII® BALLOON BLUNT TIP SYSTEM

## (undated) DEVICE — 3M™ TEGADERM™ TRANSPARENT FILM DRESSING FRAME STYLE, 1624W, 2-3/8 IN X 2-3/4 IN (6 CM X 7 CM), 100/CT 4CT/CASE: Brand: 3M™ TEGADERM™